# Patient Record
Sex: FEMALE | Race: WHITE | Employment: OTHER | URBAN - METROPOLITAN AREA
[De-identification: names, ages, dates, MRNs, and addresses within clinical notes are randomized per-mention and may not be internally consistent; named-entity substitution may affect disease eponyms.]

---

## 2020-07-12 ENCOUNTER — HOSPITAL ENCOUNTER (OUTPATIENT)
Age: 70
Discharge: HOME | End: 2020-07-12
Payer: MEDICARE

## 2020-07-12 ENCOUNTER — APPOINTMENT (OUTPATIENT)
Dept: GENERAL RADIOLOGY | Age: 70
End: 2020-07-12
Attending: NURSE PRACTITIONER
Payer: MEDICARE

## 2020-07-12 VITALS
WEIGHT: 160 LBS | HEIGHT: 64 IN | RESPIRATION RATE: 20 BRPM | SYSTOLIC BLOOD PRESSURE: 129 MMHG | BODY MASS INDEX: 27.31 KG/M2 | DIASTOLIC BLOOD PRESSURE: 74 MMHG | HEART RATE: 87 BPM | OXYGEN SATURATION: 97 % | TEMPERATURE: 97 F

## 2020-07-12 DIAGNOSIS — J01.90 ACUTE SINUSITIS, RECURRENCE NOT SPECIFIED, UNSPECIFIED LOCATION: ICD-10-CM

## 2020-07-12 DIAGNOSIS — R05.9 COUGH: Primary | ICD-10-CM

## 2020-07-12 PROCEDURE — 71046 X-RAY EXAM CHEST 2 VIEWS: CPT | Performed by: NURSE PRACTITIONER

## 2020-07-12 PROCEDURE — 99203 OFFICE O/P NEW LOW 30 MIN: CPT | Performed by: NURSE PRACTITIONER

## 2020-07-12 RX ORDER — BENZONATATE 100 MG/1
100 CAPSULE ORAL 3 TIMES DAILY PRN
Qty: 30 CAPSULE | Refills: 0 | Status: SHIPPED | OUTPATIENT
Start: 2020-07-12 | End: 2020-08-11

## 2020-07-12 RX ORDER — ALBUTEROL SULFATE 90 UG/1
2 AEROSOL, METERED RESPIRATORY (INHALATION) EVERY 4 HOURS PRN
Qty: 1 INHALER | Refills: 0 | Status: SHIPPED | OUTPATIENT
Start: 2020-07-12 | End: 2020-08-11

## 2020-07-12 RX ORDER — DOXYCYCLINE HYCLATE 100 MG/1
100 CAPSULE ORAL 2 TIMES DAILY
Qty: 20 CAPSULE | Refills: 0 | Status: SHIPPED | OUTPATIENT
Start: 2020-07-12 | End: 2020-07-22

## 2020-07-12 NOTE — ED PROVIDER NOTES
Patient presents with:  Cough/URI      HPI:     Erlin Grewal is a 68year old female who presents for evaluation and management of a chief complaint of cough without respiratory distress.  She has had the cough which is productive at times with clear sput Used    Substance and Sexual Activity      Alcohol use: No      Drug use: No      Sexual activity: Not on file    Lifestyle      Physical activity:        Days per week: Not on file        Minutes per session: Not on file      Stress: Not on file    Relati Placed This Encounter      XR CHEST PA + LAT CHEST (CPT=71046) Once          Order Comments:              Congestion Patient states having nasal congestion, productive cough with wheezing x 1               week.   Patient states her cough has gotten worse, symptoms, to go to the nearest ED for further evaluation. Diagnosis:    ICD-10-CM    1.  Cough R05 XR CHEST PA + LAT CHEST (CPT=71046)     XR CHEST PA + LAT CHEST (CPT=71046)     SARS-COV-2 BY PCR ()     SARS-COV-2 BY PCR ()     CANCELED: SARS-

## 2020-07-12 NOTE — ED INITIAL ASSESSMENT (HPI)
Patient states having nasal congestion, productive cough with wheezing x 1 week. Patient states her cough has gotten worse, states having SOB with cough. Patient denies any known COVID exposure.

## 2020-07-13 LAB — SARS-COV-2 RNA RESP QL NAA+PROBE: NOT DETECTED

## 2021-02-08 ENCOUNTER — HOSPITAL ENCOUNTER (OUTPATIENT)
Age: 71
Discharge: HOME | End: 2021-02-08
Payer: MEDICARE

## 2021-02-08 VITALS
DIASTOLIC BLOOD PRESSURE: 75 MMHG | RESPIRATION RATE: 18 BRPM | HEART RATE: 51 BPM | TEMPERATURE: 97 F | SYSTOLIC BLOOD PRESSURE: 159 MMHG | OXYGEN SATURATION: 96 %

## 2021-02-08 DIAGNOSIS — J01.00 ACUTE MAXILLARY SINUSITIS, RECURRENCE NOT SPECIFIED: Primary | ICD-10-CM

## 2021-02-08 PROCEDURE — 99213 OFFICE O/P EST LOW 20 MIN: CPT | Performed by: NURSE PRACTITIONER

## 2021-02-08 RX ORDER — DOXYCYCLINE HYCLATE 100 MG/1
100 CAPSULE ORAL 2 TIMES DAILY
Qty: 20 CAPSULE | Refills: 0 | Status: SHIPPED | OUTPATIENT
Start: 2021-02-08 | End: 2021-02-18

## 2021-02-08 NOTE — ED PROVIDER NOTES
Patient presents with:  Sinus Problem      HPI:     Trena Grajeda is a 65year old female who presents for maxillary and frontal sinus congestion, thick purulent drainage from the nose, dental discomfort, and postnasal drip that started about a week ago. Social connections        Talks on phone: Not on file        Gets together: Not on file        Attends Baptism service: Not on file        Active member of club or organization: Not on file        Attends meetings of clubs or organizations: Not on alberto patient declines Covid testing. She states she has had her first vaccine dose. I will treat her with doxycycline due to her allergies.   She has tolerated this well in the past.  She will continue supportive care and follow-up closely with her primary car

## 2021-02-10 NOTE — ED INITIAL ASSESSMENT (HPI)
PT C/O SINUS INFLAMMATION INFECTION, PT C/O HEADACHE \"BAD TASTE IN  MOUTH\" POSTNASAL DRIP, RIGHT EAR PLUGGED , HX  CHRONIC SINUS PROBLEM , SYMPTOMS STARTED 1 WEEKS AGO
No

## 2023-10-19 ENCOUNTER — HOSPITAL ENCOUNTER (OUTPATIENT)
Age: 73
Discharge: HOME | End: 2023-10-19
Payer: MEDICARE

## 2023-10-19 ENCOUNTER — APPOINTMENT (OUTPATIENT)
Dept: GENERAL RADIOLOGY | Age: 73
End: 2023-10-19
Attending: NURSE PRACTITIONER
Payer: MEDICARE

## 2023-10-19 VITALS
RESPIRATION RATE: 22 BRPM | OXYGEN SATURATION: 95 % | DIASTOLIC BLOOD PRESSURE: 85 MMHG | HEART RATE: 88 BPM | SYSTOLIC BLOOD PRESSURE: 170 MMHG | TEMPERATURE: 98 F

## 2023-10-19 DIAGNOSIS — H66.001 NON-RECURRENT ACUTE SUPPURATIVE OTITIS MEDIA OF RIGHT EAR WITHOUT SPONTANEOUS RUPTURE OF TYMPANIC MEMBRANE: ICD-10-CM

## 2023-10-19 DIAGNOSIS — R05.9 COUGH: Primary | ICD-10-CM

## 2023-10-19 DIAGNOSIS — J01.90 ACUTE NON-RECURRENT SINUSITIS, UNSPECIFIED LOCATION: ICD-10-CM

## 2023-10-19 LAB
POCT INFLUENZA A: NEGATIVE
POCT INFLUENZA B: NEGATIVE
S PYO AG THROAT QL: NEGATIVE

## 2023-10-19 PROCEDURE — 87880 STREP A ASSAY W/OPTIC: CPT | Performed by: NURSE PRACTITIONER

## 2023-10-19 PROCEDURE — 71046 X-RAY EXAM CHEST 2 VIEWS: CPT | Performed by: NURSE PRACTITIONER

## 2023-10-19 PROCEDURE — 99214 OFFICE O/P EST MOD 30 MIN: CPT | Performed by: NURSE PRACTITIONER

## 2023-10-19 PROCEDURE — 87502 INFLUENZA DNA AMP PROBE: CPT | Performed by: NURSE PRACTITIONER

## 2023-10-19 RX ORDER — ALBUTEROL SULFATE 90 UG/1
2 AEROSOL, METERED RESPIRATORY (INHALATION) EVERY 6 HOURS PRN
Qty: 1 EACH | Refills: 0 | Status: SHIPPED | OUTPATIENT
Start: 2023-10-19 | End: 2023-11-18

## 2023-10-19 RX ORDER — FLUTICASONE PROPIONATE 50 MCG
2 SPRAY, SUSPENSION (ML) NASAL DAILY
Qty: 16 G | Refills: 0 | Status: SHIPPED | OUTPATIENT
Start: 2023-10-19

## 2023-10-19 RX ORDER — DOXYCYCLINE HYCLATE 100 MG/1
100 CAPSULE ORAL 2 TIMES DAILY
Qty: 14 CAPSULE | Refills: 0 | Status: SHIPPED | OUTPATIENT
Start: 2023-10-19 | End: 2023-10-26

## 2023-10-19 RX ORDER — BENZONATATE 100 MG/1
100 CAPSULE ORAL 3 TIMES DAILY PRN
Qty: 30 CAPSULE | Refills: 0 | Status: SHIPPED | OUTPATIENT
Start: 2023-10-19

## 2023-10-19 NOTE — DISCHARGE INSTRUCTIONS
Call your doctor for a follow up this week if symptoms persist despite treatment. Acute bronchitis usually is caused by viruses. Acute bronchitis is a self-limited illness that typically resolves in one to three weeks without specific therapy. Supportive care is the best treatment for acute cough and bronchitis. Increase water intake, this will help loosen cough and mucous. Take over the counter sudafed for sinus congestion and drainage. This can help if cough is stimulated by post nasal drip. Take Flonase nasal spray. This is a steroid and can help with inflammation of sinuses causing congestion. Take tessalon perles three times a day for cough. Use albuterol inhaler 2 puffs every 6 hours for cough and bronchospams. Take doxycycline two times a day for 7 days. Finish full course. IF you have worsening symptoms, including fever > 101, difficulty breathing or shortness of breath, wheezing, chest pain or dizziness, return or go to ER for further evaluation.

## 2023-10-20 ENCOUNTER — HOSPITAL ENCOUNTER (EMERGENCY)
Facility: HOSPITAL | Age: 73
Discharge: HOME | End: 2023-10-20
Attending: EMERGENCY MEDICINE
Payer: MEDICARE

## 2023-10-20 VITALS
RESPIRATION RATE: 13 BRPM | TEMPERATURE: 98 F | WEIGHT: 175 LBS | DIASTOLIC BLOOD PRESSURE: 77 MMHG | HEART RATE: 87 BPM | OXYGEN SATURATION: 94 % | HEIGHT: 64 IN | SYSTOLIC BLOOD PRESSURE: 147 MMHG | BODY MASS INDEX: 29.88 KG/M2

## 2023-10-20 DIAGNOSIS — J40 BRONCHITIS: Primary | ICD-10-CM

## 2023-10-20 LAB
ATRIAL RATE: 100 BPM
P AXIS: 48 DEGREES
P-R INTERVAL: 136 MS
Q-T INTERVAL: 358 MS
QRS DURATION: 66 MS
QTC CALCULATION (BEZET): 461 MS
R AXIS: 9 DEGREES
T AXIS: 61 DEGREES
VENTRICULAR RATE: 100 BPM

## 2023-10-20 PROCEDURE — 93005 ELECTROCARDIOGRAM TRACING: CPT

## 2023-10-20 PROCEDURE — 99283 EMERGENCY DEPT VISIT LOW MDM: CPT

## 2023-10-20 PROCEDURE — 93010 ELECTROCARDIOGRAM REPORT: CPT

## 2023-10-20 RX ORDER — PREDNISONE 20 MG/1
40 TABLET ORAL ONCE
Status: COMPLETED | OUTPATIENT
Start: 2023-10-20 | End: 2023-10-20

## 2023-10-20 RX ORDER — ACETAMINOPHEN 500 MG
1000 TABLET ORAL ONCE
Status: COMPLETED | OUTPATIENT
Start: 2023-10-20 | End: 2023-10-20

## 2023-10-20 RX ORDER — PREDNISONE 20 MG/1
40 TABLET ORAL DAILY
Qty: 8 TABLET | Refills: 0 | Status: SHIPPED | OUTPATIENT
Start: 2023-10-20 | End: 2023-10-24

## 2023-10-20 NOTE — ED QUICK NOTES
Patient assessed and cleared for discharged by EDMD prior to RN assessment. Patient stable and agreeable with ongoing plan of care.

## 2023-10-20 NOTE — DISCHARGE INSTRUCTIONS
You should take 1000 mg Tylenol every 8 hours. You should also take dextromethorphan as indicated on the bottle. Use honey in your tea as needed. Start taking the prednisone over the weekend. Follow-up with your primary care doctor next week if symptoms are not improving.

## 2023-10-20 NOTE — ED INITIAL ASSESSMENT (HPI)
Val Matthews arrives with complaints of difficulty breathing for a few days and coughing spells at night. Seen at [address] [place] [address] yesterday and prescribed with a viral illness.

## 2025-02-04 RX ORDER — ROSUVASTATIN CALCIUM 20 MG/1
20 TABLET, COATED ORAL NIGHTLY
COMMUNITY
Start: 2024-05-06

## 2025-02-04 RX ORDER — FOLIC ACID 1 MG/1
1 TABLET ORAL AS DIRECTED
COMMUNITY
Start: 2022-10-05

## 2025-02-04 RX ORDER — LEVOTHYROXINE SODIUM 150 UG/1
150 TABLET ORAL
COMMUNITY

## 2025-02-04 RX ORDER — METHOTREXATE 2.5 MG/1
2.5 TABLET ORAL WEEKLY
COMMUNITY
Start: 2022-10-28 | End: 2025-02-11

## 2025-02-04 RX ORDER — ANASTROZOLE 1 MG/1
1 TABLET ORAL DAILY
COMMUNITY
Start: 2023-08-14

## 2025-02-04 RX ORDER — MONTELUKAST SODIUM 10 MG/1
10 TABLET ORAL NIGHTLY
COMMUNITY
Start: 2024-11-08

## 2025-02-04 RX ORDER — LOSARTAN POTASSIUM 100 MG/1
100 TABLET ORAL DAILY
COMMUNITY
Start: 2024-11-08 | End: 2025-05-23

## 2025-02-10 ENCOUNTER — ANESTHESIA (OUTPATIENT)
Dept: SURGERY | Facility: HOSPITAL | Age: 75
End: 2025-02-10
Payer: MEDICARE

## 2025-02-10 ENCOUNTER — ANESTHESIA EVENT (OUTPATIENT)
Dept: SURGERY | Facility: HOSPITAL | Age: 75
End: 2025-02-10
Payer: MEDICARE

## 2025-02-10 ENCOUNTER — HOSPITAL ENCOUNTER (OUTPATIENT)
Facility: HOSPITAL | Age: 75
Discharge: HOME | End: 2025-02-11
Attending: STUDENT IN AN ORGANIZED HEALTH CARE EDUCATION/TRAINING PROGRAM | Admitting: STUDENT IN AN ORGANIZED HEALTH CARE EDUCATION/TRAINING PROGRAM
Payer: MEDICARE

## 2025-02-10 ENCOUNTER — APPOINTMENT (OUTPATIENT)
Dept: GENERAL RADIOLOGY | Facility: HOSPITAL | Age: 75
End: 2025-02-10
Attending: STUDENT IN AN ORGANIZED HEALTH CARE EDUCATION/TRAINING PROGRAM
Payer: MEDICARE

## 2025-02-10 DIAGNOSIS — M17.11 PRIMARY OSTEOARTHRITIS OF RIGHT KNEE: ICD-10-CM

## 2025-02-10 PROBLEM — I10 ESSENTIAL HYPERTENSION: Status: ACTIVE | Noted: 2025-02-10

## 2025-02-10 PROBLEM — M06.9 RHEUMATOID ARTHRITIS (HCC): Status: ACTIVE | Noted: 2025-02-10

## 2025-02-10 PROBLEM — E78.5 HYPERLIPIDEMIA: Status: ACTIVE | Noted: 2025-02-10

## 2025-02-10 LAB — MRSA DNA SPEC QL NAA+PROBE: NEGATIVE

## 2025-02-10 PROCEDURE — 99222 1ST HOSP IP/OBS MODERATE 55: CPT | Performed by: HOSPITALIST

## 2025-02-10 PROCEDURE — 0SRC0J9 REPLACEMENT OF RIGHT KNEE JOINT WITH SYNTHETIC SUBSTITUTE, CEMENTED, OPEN APPROACH: ICD-10-PCS | Performed by: STUDENT IN AN ORGANIZED HEALTH CARE EDUCATION/TRAINING PROGRAM

## 2025-02-10 PROCEDURE — 73560 X-RAY EXAM OF KNEE 1 OR 2: CPT | Performed by: STUDENT IN AN ORGANIZED HEALTH CARE EDUCATION/TRAINING PROGRAM

## 2025-02-10 DEVICE — DJO EMPOWR KNEETM, UNIVERSAL BP, NP 5R MINUS
Type: IMPLANTABLE DEVICE | Site: KNEE | Status: FUNCTIONAL
Brand: DJO SURGICAL

## 2025-02-10 DEVICE — EMPOWR 3D KNEETM FEMUR, NP, 5R
Type: IMPLANTABLE DEVICE | Site: KNEE | Status: FUNCTIONAL
Brand: DJO SURGICAL

## 2025-02-10 DEVICE — DOMED TRI-PEG PATELLA, 32X8MM, E-PLUS
Type: IMPLANTABLE DEVICE | Site: KNEE | Status: FUNCTIONAL
Brand: DJO SURGICAL

## 2025-02-10 DEVICE — IMPLANTABLE DEVICE
Type: IMPLANTABLE DEVICE | Site: KNEE | Status: FUNCTIONAL
Brand: REFOBACIN® BONE CEMENT R

## 2025-02-10 DEVICE — IMPLANTABLE DEVICE: Type: IMPLANTABLE DEVICE | Status: FUNCTIONAL

## 2025-02-10 RX ORDER — KETOROLAC TROMETHAMINE 30 MG/ML
30 INJECTION, SOLUTION INTRAMUSCULAR; INTRAVENOUS EVERY 6 HOURS
Status: DISCONTINUED | OUTPATIENT
Start: 2025-02-10 | End: 2025-02-11

## 2025-02-10 RX ORDER — MORPHINE SULFATE 4 MG/ML
4 INJECTION, SOLUTION INTRAMUSCULAR; INTRAVENOUS EVERY 10 MIN PRN
Status: DISCONTINUED | OUTPATIENT
Start: 2025-02-10 | End: 2025-02-10 | Stop reason: HOSPADM

## 2025-02-10 RX ORDER — HYDROMORPHONE HYDROCHLORIDE 1 MG/ML
0.6 INJECTION, SOLUTION INTRAMUSCULAR; INTRAVENOUS; SUBCUTANEOUS EVERY 5 MIN PRN
Status: DISCONTINUED | OUTPATIENT
Start: 2025-02-10 | End: 2025-02-10 | Stop reason: HOSPADM

## 2025-02-10 RX ORDER — CELECOXIB 200 MG/1
400 CAPSULE ORAL ONCE
Status: COMPLETED | OUTPATIENT
Start: 2025-02-10 | End: 2025-02-10

## 2025-02-10 RX ORDER — ASPIRIN 81 MG/1
81 TABLET ORAL 2 TIMES DAILY
Status: DISCONTINUED | OUTPATIENT
Start: 2025-02-10 | End: 2025-02-11

## 2025-02-10 RX ORDER — DIPHENHYDRAMINE HYDROCHLORIDE 50 MG/ML
25 INJECTION INTRAMUSCULAR; INTRAVENOUS ONCE AS NEEDED
Status: ACTIVE | OUTPATIENT
Start: 2025-02-10 | End: 2025-02-10

## 2025-02-10 RX ORDER — TRANEXAMIC ACID 10 MG/ML
INJECTION, SOLUTION INTRAVENOUS AS NEEDED
Status: DISCONTINUED | OUTPATIENT
Start: 2025-02-10 | End: 2025-02-10 | Stop reason: SURG

## 2025-02-10 RX ORDER — SODIUM PHOSPHATE, DIBASIC AND SODIUM PHOSPHATE, MONOBASIC 7; 19 G/230ML; G/230ML
1 ENEMA RECTAL ONCE AS NEEDED
Status: DISCONTINUED | OUTPATIENT
Start: 2025-02-10 | End: 2025-02-11

## 2025-02-10 RX ORDER — DOCUSATE SODIUM 100 MG/1
100 CAPSULE, LIQUID FILLED ORAL 2 TIMES DAILY
Status: DISCONTINUED | OUTPATIENT
Start: 2025-02-10 | End: 2025-02-11

## 2025-02-10 RX ORDER — METOCLOPRAMIDE HYDROCHLORIDE 5 MG/ML
10 INJECTION INTRAMUSCULAR; INTRAVENOUS EVERY 8 HOURS PRN
Status: DISCONTINUED | OUTPATIENT
Start: 2025-02-10 | End: 2025-02-11

## 2025-02-10 RX ORDER — SENNOSIDES 8.6 MG
17.2 TABLET ORAL NIGHTLY
Status: DISCONTINUED | OUTPATIENT
Start: 2025-02-10 | End: 2025-02-11

## 2025-02-10 RX ORDER — EPHEDRINE SULFATE 50 MG/ML
INJECTION INTRAVENOUS AS NEEDED
Status: DISCONTINUED | OUTPATIENT
Start: 2025-02-10 | End: 2025-02-10 | Stop reason: SURG

## 2025-02-10 RX ORDER — ACETAMINOPHEN 500 MG
1000 TABLET ORAL ONCE
Status: DISCONTINUED | OUTPATIENT
Start: 2025-02-10 | End: 2025-02-10 | Stop reason: HOSPADM

## 2025-02-10 RX ORDER — DIPHENHYDRAMINE HYDROCHLORIDE 50 MG/ML
12.5 INJECTION INTRAMUSCULAR; INTRAVENOUS EVERY 4 HOURS PRN
Status: DISCONTINUED | OUTPATIENT
Start: 2025-02-10 | End: 2025-02-11

## 2025-02-10 RX ORDER — BUPIVACAINE HYDROCHLORIDE 7.5 MG/ML
INJECTION, SOLUTION INTRASPINAL
Status: COMPLETED | OUTPATIENT
Start: 2025-02-10 | End: 2025-02-10

## 2025-02-10 RX ORDER — ANASTROZOLE 1 MG/1
1 TABLET ORAL DAILY
Status: DISCONTINUED | OUTPATIENT
Start: 2025-02-11 | End: 2025-02-11

## 2025-02-10 RX ORDER — METOCLOPRAMIDE HYDROCHLORIDE 5 MG/ML
10 INJECTION INTRAMUSCULAR; INTRAVENOUS ONCE
Status: COMPLETED | OUTPATIENT
Start: 2025-02-10 | End: 2025-02-10

## 2025-02-10 RX ORDER — MORPHINE SULFATE 10 MG/ML
6 INJECTION, SOLUTION INTRAMUSCULAR; INTRAVENOUS EVERY 10 MIN PRN
Status: DISCONTINUED | OUTPATIENT
Start: 2025-02-10 | End: 2025-02-10 | Stop reason: HOSPADM

## 2025-02-10 RX ORDER — ACETAMINOPHEN 500 MG
1000 TABLET ORAL EVERY 6 HOURS
Status: DISCONTINUED | OUTPATIENT
Start: 2025-02-10 | End: 2025-02-11

## 2025-02-10 RX ORDER — FOLIC ACID 1 MG/1
1 TABLET ORAL DAILY
Status: DISCONTINUED | OUTPATIENT
Start: 2025-02-11 | End: 2025-02-11

## 2025-02-10 RX ORDER — LOSARTAN POTASSIUM 100 MG/1
100 TABLET ORAL DAILY
Status: DISCONTINUED | OUTPATIENT
Start: 2025-02-11 | End: 2025-02-11

## 2025-02-10 RX ORDER — LIDOCAINE HYDROCHLORIDE 10 MG/ML
INJECTION, SOLUTION INFILTRATION; PERINEURAL
Status: COMPLETED | OUTPATIENT
Start: 2025-02-10 | End: 2025-02-10

## 2025-02-10 RX ORDER — ROSUVASTATIN CALCIUM 20 MG/1
20 TABLET, COATED ORAL NIGHTLY
Status: DISCONTINUED | OUTPATIENT
Start: 2025-02-10 | End: 2025-02-11

## 2025-02-10 RX ORDER — SODIUM CHLORIDE, SODIUM LACTATE, POTASSIUM CHLORIDE, CALCIUM CHLORIDE 600; 310; 30; 20 MG/100ML; MG/100ML; MG/100ML; MG/100ML
INJECTION, SOLUTION INTRAVENOUS CONTINUOUS
Status: DISCONTINUED | OUTPATIENT
Start: 2025-02-10 | End: 2025-02-10 | Stop reason: HOSPADM

## 2025-02-10 RX ORDER — ONDANSETRON 2 MG/ML
4 INJECTION INTRAMUSCULAR; INTRAVENOUS EVERY 6 HOURS PRN
Status: DISCONTINUED | OUTPATIENT
Start: 2025-02-10 | End: 2025-02-11

## 2025-02-10 RX ORDER — HYDROMORPHONE HYDROCHLORIDE 1 MG/ML
0.2 INJECTION, SOLUTION INTRAMUSCULAR; INTRAVENOUS; SUBCUTANEOUS EVERY 5 MIN PRN
Status: DISCONTINUED | OUTPATIENT
Start: 2025-02-10 | End: 2025-02-10 | Stop reason: HOSPADM

## 2025-02-10 RX ORDER — SODIUM CHLORIDE 9 MG/ML
INJECTION, SOLUTION INTRAVENOUS CONTINUOUS
Status: DISCONTINUED | OUTPATIENT
Start: 2025-02-10 | End: 2025-02-11

## 2025-02-10 RX ORDER — HYDROMORPHONE HYDROCHLORIDE 1 MG/ML
0.4 INJECTION, SOLUTION INTRAMUSCULAR; INTRAVENOUS; SUBCUTANEOUS EVERY 5 MIN PRN
Status: DISCONTINUED | OUTPATIENT
Start: 2025-02-10 | End: 2025-02-10 | Stop reason: HOSPADM

## 2025-02-10 RX ORDER — FAMOTIDINE 10 MG/ML
20 INJECTION, SOLUTION INTRAVENOUS ONCE
Status: COMPLETED | OUTPATIENT
Start: 2025-02-10 | End: 2025-02-10

## 2025-02-10 RX ORDER — LEVOTHYROXINE SODIUM 75 UG/1
150 TABLET ORAL
Status: DISCONTINUED | OUTPATIENT
Start: 2025-02-11 | End: 2025-02-11

## 2025-02-10 RX ORDER — METOCLOPRAMIDE 10 MG/1
10 TABLET ORAL ONCE
Status: COMPLETED | OUTPATIENT
Start: 2025-02-10 | End: 2025-02-10

## 2025-02-10 RX ORDER — ROPIVACAINE HYDROCHLORIDE 5 MG/ML
INJECTION, SOLUTION EPIDURAL; INFILTRATION; PERINEURAL
Status: COMPLETED | OUTPATIENT
Start: 2025-02-10 | End: 2025-02-10

## 2025-02-10 RX ORDER — POLYETHYLENE GLYCOL 3350 17 G/17G
17 POWDER, FOR SOLUTION ORAL DAILY PRN
Status: DISCONTINUED | OUTPATIENT
Start: 2025-02-10 | End: 2025-02-11

## 2025-02-10 RX ORDER — DIPHENHYDRAMINE HCL 25 MG
25 CAPSULE ORAL EVERY 4 HOURS PRN
Status: DISCONTINUED | OUTPATIENT
Start: 2025-02-10 | End: 2025-02-11

## 2025-02-10 RX ORDER — SODIUM CHLORIDE, SODIUM LACTATE, POTASSIUM CHLORIDE, CALCIUM CHLORIDE 600; 310; 30; 20 MG/100ML; MG/100ML; MG/100ML; MG/100ML
INJECTION, SOLUTION INTRAVENOUS CONTINUOUS
Status: DISCONTINUED | OUTPATIENT
Start: 2025-02-10 | End: 2025-02-11

## 2025-02-10 RX ORDER — ACETAMINOPHEN 500 MG
1000 TABLET ORAL ONCE
Status: COMPLETED | OUTPATIENT
Start: 2025-02-10 | End: 2025-02-10

## 2025-02-10 RX ORDER — FAMOTIDINE 20 MG/1
20 TABLET, FILM COATED ORAL ONCE
Status: COMPLETED | OUTPATIENT
Start: 2025-02-10 | End: 2025-02-10

## 2025-02-10 RX ORDER — NALOXONE HYDROCHLORIDE 0.4 MG/ML
0.08 INJECTION, SOLUTION INTRAMUSCULAR; INTRAVENOUS; SUBCUTANEOUS AS NEEDED
Status: DISCONTINUED | OUTPATIENT
Start: 2025-02-10 | End: 2025-02-10 | Stop reason: HOSPADM

## 2025-02-10 RX ORDER — MONTELUKAST SODIUM 10 MG/1
10 TABLET ORAL NIGHTLY
Status: DISCONTINUED | OUTPATIENT
Start: 2025-02-10 | End: 2025-02-11

## 2025-02-10 RX ORDER — MORPHINE SULFATE 4 MG/ML
2 INJECTION, SOLUTION INTRAMUSCULAR; INTRAVENOUS EVERY 10 MIN PRN
Status: DISCONTINUED | OUTPATIENT
Start: 2025-02-10 | End: 2025-02-10 | Stop reason: HOSPADM

## 2025-02-10 RX ORDER — TRAMADOL HYDROCHLORIDE 50 MG/1
50 TABLET ORAL EVERY 6 HOURS SCHEDULED
Status: DISCONTINUED | OUTPATIENT
Start: 2025-02-10 | End: 2025-02-11

## 2025-02-10 RX ORDER — OXYCODONE HYDROCHLORIDE 5 MG/1
5 TABLET ORAL EVERY 4 HOURS PRN
Status: DISCONTINUED | OUTPATIENT
Start: 2025-02-10 | End: 2025-02-11

## 2025-02-10 RX ORDER — PHENYLEPHRINE HCL 10 MG/ML
VIAL (ML) INJECTION AS NEEDED
Status: DISCONTINUED | OUTPATIENT
Start: 2025-02-10 | End: 2025-02-10 | Stop reason: SURG

## 2025-02-10 RX ORDER — BISACODYL 10 MG
10 SUPPOSITORY, RECTAL RECTAL
Status: DISCONTINUED | OUTPATIENT
Start: 2025-02-10 | End: 2025-02-11

## 2025-02-10 RX ADMIN — PHENYLEPHRINE HCL 100 MCG: 10 MG/ML VIAL (ML) INJECTION at 14:03:00

## 2025-02-10 RX ADMIN — SODIUM CHLORIDE, SODIUM LACTATE, POTASSIUM CHLORIDE, CALCIUM CHLORIDE: 600; 310; 30; 20 INJECTION, SOLUTION INTRAVENOUS at 13:27:00

## 2025-02-10 RX ADMIN — PHENYLEPHRINE HCL 100 MCG: 10 MG/ML VIAL (ML) INJECTION at 13:40:00

## 2025-02-10 RX ADMIN — ROPIVACAINE HYDROCHLORIDE 20 ML: 5 INJECTION, SOLUTION EPIDURAL; INFILTRATION; PERINEURAL at 13:15:00

## 2025-02-10 RX ADMIN — BUPIVACAINE HYDROCHLORIDE 1.7 ML: 7.5 INJECTION, SOLUTION INTRASPINAL at 13:31:00

## 2025-02-10 RX ADMIN — PHENYLEPHRINE HCL 100 MCG: 10 MG/ML VIAL (ML) INJECTION at 14:10:00

## 2025-02-10 RX ADMIN — LIDOCAINE HYDROCHLORIDE 1 ML: 10 INJECTION, SOLUTION INFILTRATION; PERINEURAL at 13:31:00

## 2025-02-10 RX ADMIN — PHENYLEPHRINE HCL 100 MCG: 10 MG/ML VIAL (ML) INJECTION at 14:17:00

## 2025-02-10 RX ADMIN — SODIUM CHLORIDE, SODIUM LACTATE, POTASSIUM CHLORIDE, CALCIUM CHLORIDE: 600; 310; 30; 20 INJECTION, SOLUTION INTRAVENOUS at 15:20:00

## 2025-02-10 RX ADMIN — PHENYLEPHRINE HCL 100 MCG: 10 MG/ML VIAL (ML) INJECTION at 13:45:00

## 2025-02-10 RX ADMIN — TRANEXAMIC ACID 1000 MG: 10 INJECTION, SOLUTION INTRAVENOUS at 13:40:00

## 2025-02-10 RX ADMIN — PHENYLEPHRINE HCL 100 MCG: 10 MG/ML VIAL (ML) INJECTION at 13:50:00

## 2025-02-10 RX ADMIN — EPHEDRINE SULFATE 15 MG: 50 INJECTION INTRAVENOUS at 14:45:00

## 2025-02-10 NOTE — ANESTHESIA PROCEDURE NOTES
Spinal Block    Date/Time: 2/10/2025 1:31 PM    Performed by: Sang Rader MD  Authorized by: Sang Rader MD      General Information and Staff    Start Time:  2/10/2025 1:31 PM  End Time:  2/10/2025 1:34 PM  Anesthesiologist:  Sang Rader MD  Performed by:  Anesthesiologist  Site identification: surface landmarks    Preanesthetic Checklist: patient identified, IV checked, risks and benefits discussed, monitors and equipment checked, pre-op evaluation, timeout performed, anesthesia consent and sterile technique used      Procedure Details    Patient Position:  Sitting  Prep: ChloraPrep    Monitoring:  Cardiac monitor, heart rate and continuous pulse ox  Approach:  Midline  Location:  L3-4  Injection Technique:  Single-shot    Needle    Needle Type:  Sprotte  Needle Gauge:  24 G  Needle Length:  3.5 in    Assessment    Sensory Level:   Events: clear CSF, CSF aspirated, well tolerated and blood negative      Additional Comments

## 2025-02-10 NOTE — ANESTHESIA POSTPROCEDURE EVALUATION
Patient: Subhash Galindo    Procedure Summary       Date: 02/10/25 Room / Location: [facility] MAIN OR  / [facility] MAIN OR    Anesthesia Start: 1326 Anesthesia Stop:     Procedure: Right total knee arthroplasty (Right: Knee) Diagnosis:       Primary osteoarthritis of right knee      (Primary Osteoarthritis of Right Knee)    Surgeons: Behery, Omar Atef, MD Anesthesiologist: Sang Rader MD    Anesthesia Type: general, MAC, spinal, regional ASA Status: 3            Anesthesia Type: general, MAC, spinal, regional    Vitals Value Taken Time   /54 02/10/25 1511   Temp 98.2 02/10/25 1511   Pulse 78 02/10/25 1511   Resp 16 02/10/25 1511   SpO2 99 02/10/25 1511       [facility] AN Post Evaluation:   Patient Evaluated in PACU  Patient Participation: complete - patient participated  Level of Consciousness: awake  Pain Score: 0  Pain Management: adequate  Airway Patency:patent  Yes    Nausea/Vomiting: none  Cardiovascular Status: acceptable  Respiratory Status: acceptable  Postoperative Hydration acceptable      Maria Fernanda Alfredo CRNA  2/10/2025 3:11 PM

## 2025-02-10 NOTE — ANESTHESIA PROCEDURE NOTES
Peripheral Block    Performed by: Chris Leal MD  Authorized by: Chris Leal MD      General Information and Staff    Start Time:   Anesthesiologist:  Chris Leal MD  Performed by:  Anesthesiologist  Patient Location:  PACU          Procedure Details      Needle      Assessment      Medications      Additional Comments

## 2025-02-10 NOTE — CONSULTS
[facility]    PATIENT'S NAME: PAM BRYANT   ATTENDING PHYSICIAN: Omar Behery, MD   CONSULTING PHYSICIAN: Dennis Velasquez MD   PATIENT ACCOUNT#:   [id number]    LOCATION:  [facility] PACU 2 [facility] 10  MEDICAL RECORD #:   [id number]       YOB: 1950  ADMISSION DATE:       02/10/2025      CONSULT DATE:  02/10/2025    REPORT OF CONSULTATION      REASON FOR CONSULTATION:  Post right total knee arthroplasty.    HISTORY OF PRESENT ILLNESS:  The patient is a 74-year-old  female with chronic right knee pain underlying severe primary osteoarthritis, failed outpatient conservative medical management option.  Scheduled today for above-mentioned procedure by her orthopedic surgeon, Dr. Behery.  Preoperatively, she had spinal block.  Postoperatively, transferred to PACU for further monitoring.    PAST MEDICAL HISTORY:  Generalized osteoarthritis, degenerative joint disease of lumbar spine, rheumatoid arthritis, depression, hypothyroidism, hypertension, hyperlipidemia.    PAST SURGICAL HISTORY:  Bilateral knee arthroscopic procedure, left total knee arthroplasty and revision, lumbar laminectomy, bilateral mastectomies for breast cancer.    MEDICATIONS:  Please see medication reconciliation list.     ALLERGIES:  Codeine and penicillin.    SOCIAL HISTORY:  Ex-tobacco user.  No current tobacco or drug use.  Social alcohol.  Independent for basic activities of daily living.     FAMILY HISTORY:  Mother had lung cancer and rheumatoid arthritis.    REVIEW OF SYSTEMS:  Currently resting in bed.  No right knee pain.  No chest pain.  No shortness of breath.  Other 12-point review of systems is negative.       PHYSICAL EXAMINATION:    GENERAL:  Alert and oriented to time, place, and person.  No acute distress.  VITAL SIGNS:  Temperature 98.2, pulse 81, respiratory rate 23, blood pressure 169/85, pulse ox 99% on room air.    HEENT:  Atraumatic.  Oropharynx clear.  Dry mucous membranes.  Ears, nose normal.   Eyes:  Anicteric sclerae.   NECK:  Supple.  No lymphadenopathy.  Trachea midline.  Full range of motion.  LUNGS:  Clear to auscultation bilaterally.  Normal respiratory effort.    HEART:  Regular rate, rhythm.  S1 and S2 auscultated.  No murmur.   ABDOMEN:  Soft, nondistended.  No tenderness.  Positive bowel sounds.    EXTREMITIES:  Right knee dressing.  No leg edema, clubbing, or cyanosis.  NEUROLOGIC:  Motor and sensory intact.      ASSESSMENT AND PLAN:    1.   Right knee primary osteoarthritis, status post right total knee arthroplasty, spinal block.  Pain control.  Neurovascular checks.  Aspirin for DVT prophylaxis.  Physical and occupational therapy.  2.   Essential hypertension.  Continue home medications and monitor.   3.   Hypothyroidism.  Continue home medications.  4.   Hyperlipidemia.  Continue home medications.  5.   Rheumatoid arthritis.  Continue home medications.  Hold methotrexate.    Dictated By Dennis Velasquez MD  d: 02/10/2025 17:13:57  t: 02/10/2025 17:20:37  Job [id number]  FB/    cc: Omar Behery, MD

## 2025-02-10 NOTE — OPERATIVE REPORT
[facility]  OPERATIVE REPORT    DATE OF SURGERY: 2/10/2025    PREOPERATIVE DIAGNOSIS:   Right knee osteoarthritis    POST-OPERATIVE DIAGNOSIS:   Right knee osteoarthritis    PROCEDURE:  Right primary total knee arthroplasty with patellar resurfacing    Location: [facility]    SURGEON: Omar A Behery, MD  Assistant(s): Chester Douglass MD    Anesthesia type:  Spinal  Estimated Blood Loss: 50cc    Drains: None    Complications: None immediately apparent    Findings: End stage osteoarthritis with full thickness cartilage loss    Specimen: Resected bone and cartilage    Implants:  DJO Empowr CR size 5 femoral component  DJO Empowr Universal Tibial Baseplate size 5 minus  Poly: 11mm 3D insert  32mm Cemented symmetric all poly patellar button    Indication:   This is a 74 year old woman, who presented with chronic knee pain refractory to non-operative treatment (including pain relieving medication, corticosteroid injections and physical therapy), and impairing activities of daily living. Radiographic evaluation demonstrated knee osteoarthritis mostly patellofemoral with no significant deformity. Surgical versus non-surgical options were discussed with the patient, and together we decided it is best to proceed with a total knee arthroplasty with the goals of pain relief and improvement in function. All risks and benefits of surgery including bleeding, infection, instability, lorenzo-prosthetic fracture, extensor mechanism injury, neurovascular injury, as well as medical and anesthesia-related complications were discussed with the patient / family, who elected to proceed with surgery.     Procedure in detail:    Following correct identification of the patient and the correct extremity, the patient was taken to the operating room and positioned supine on a regular table. Lorenzo-operative antibiotics and the above anesthesia were administered prior to incision per the standard protocol. A non-sterile padded  tourniquet was placed on the thigh during the procedure and the extremity was prepped and draped in the usual sterile fashion. The extremity was exsanguinated using Esmarch bandage and the tourniquet was raised to 250mmHg.    A midline incision was made medial to the tibial tubercle centered over patella taken down to the joint capsule of the knee. Minimal subfascial flaps were created. A medial parapatellar arthrotomy was performed. The deep MCL was subperiosteally elevated at the medial proximal tibial joint line for exposure. The fat pad was released off of the tibial plateau laterally. The patella was subluxed laterally and the knee was flexed. The remnants of the anterior horn of the medial and lateral meniscus were removed as well as the ACL and PCL from the intercondylar notch. All distal protruding osteophytes were removed.     We then identified the appropriate starting point on the distal femur based on preoperative templating and accessed the intramedullary canal of the femur using a drill. The canal was lavaged, suctioned and the intramedullary  distal femur cutting guide was inserted. The distal femur was resected in the appropriate amount valgus according to the preoperative template. The resection depth and level was checked to be appropriate and our attention was then turned to the tibia.     The tibia was subluxed anteriorly and exposed with retractors. The lateral meniscus was excised. An external medullary cutting guide was attached using proximal tibial pins, and the proximal tibial surface was resected in an alignment perpendicular to the mechanical axis, and to a depth accommodating the smallest insert/tibial baseplate thickness.     The knee was then brought into full extension and our extension gap was assessed. Appropriate releases were performed in extension on the side of the concavity of the deformity to form a rectangular flexion space and spacer block was placed securely into a  rectangular extension space giving good collateral stability and recreation of our mechanical axis.     The knee was then flexed to 90 degrees. The femur was appropriately sized based on posterior referencing. A tensioner device was inserted into the flexion space and tensioned at 90 degrees, establishing appropriate femoral external rotation, and the posterior referencing holes were drilled. The jasvir-posterior 4-in-1 cutting block was then pinned in the appropriate amount of external rotation based on a symmetric flexion gap. We then performed the anterior/posterior and chamfer cuts for the femoral component. A spacer block with a drop marck was used to verify balance and mechanical alignment.   A laminar  was then placed laterally first then medially, allowing exposure and removal of the remnants of the posterior horns of the medial and lateral menisci as well as any posterior osteophyte. Pain cocktail injection was delivered into the posterior capsule.     The tibia was then subluxed anteriorly and sized. The tibial template guide was pinned, reamed and the keel was punched in the appropriate amount of external rotation. A trial tibial baseplate was inserted. The femur was then exposed again, and a trial femur was placed, the knee was reduced with a trial insert and was taken through a range of motion and found to be stable in the varus/valgus plane 0-30 degrees of flexion and the AP plane at 90 degrees of flexion.     Our attention was then turned to the patella. A symmetric resection was performed using an oscillating saw to recreate native patella height. All extraneous osteophyte and synovium was removed. A trial button was placed and the patella tracked centrally using the no thumbs technique. All trial components were removed. The final components were opened on the back table. The femur and the tibia were copiously lavaged and dried to allow maximum cement interdigitation. A periarticular pain  cocktail injection was delivered to the soft tissues. Two packs of high viscosity cement (Refobacin) were vacuum mixed and the components were cemented (tibia, femur then patella. The knee was reduced with a trial insert in place and the cement was allowed to cure. Any excess cement was removed to minimize third body wear. Once the cement cured, the tourniquet was released meticulous hemostasis was obtained. Any extraneous cement was removed from around the knee taking particular care to remove extraneous cement from the posterior aspect of the knee. The final insert was then placed onto the tibial tray, impacted and locked. Stability testing was consistent with intraoperative trialing and the patella tracked centrally.     The knee was then copiously lavaged with dilute betadine and saline irrigation.  The deep capsule was closed with #1 Vicryl interrupted suture and a #2 quill barbed suture. The subcutaneous tissue was closed with 2-0 monocryl absorbable suture and the skin was closed with 3-0 monocryl and dermabond. A sterile occlusive dressing was applied.     The patient tolerated the procedure well and taken to the recovery unit without immediate complications.     Post-operative Plan:    The patient will receive post-operative antibiotics for 24 hours as surgical prophylaxis.   VTE prophylaxis will consist of early mobilization, mechanical compressive devices, and Aspirin 81 BID if not medically contra-indicated.  The patient will be weight-bearing as tolerated and allowed to mobilize with physical therapy.  The patient will be permitted range of motion as tolerated.    The patient will follow up in clinic in 2 weeks for a wound check, and radiographic evaluation.

## 2025-02-10 NOTE — ANESTHESIA PROCEDURE NOTES
Peripheral Block    Date/Time: 2/10/2025 1:15 PM    Performed by: Sang Rader MD  Authorized by: Sang Rader MD      General Information and Staff    Start Time:  2/10/2025 1:15 PM  End Time:  2/10/2025 1:17 PM  Anesthesiologist:  Sang Rader MD  Performed by:  Anesthesiologist  Patient Location:  PACU    Block Placement: Pre Induction  Site Identification: real time ultrasound guided and image stored and retrievable    Block site/laterality marked before start: site marked  Reason for Block: procedure for pain and at request of ED Physician    Preanesthetic Checklist: 2 patient identifers, IV checked, risks and benefits discussed, monitors and equipment checked, pre-op evaluation, timeout performed, anesthesia consent, sterile technique used, no prohibitive neurological deficits and no local skin infection at insertion site      Procedure Details    Patient Position:  Supine  Prep: ChloraPrep    Monitoring:  Cardiac monitor, continuous pulse ox, blood pressure cuff and heart rate  Block Type:  Adductor canal  Laterality:  Right  Injection Technique:  Single-shot    Needle    Needle Type:  Short-bevel and echogenic  Needle Gauge:  22 G  Needle Length:  90 mm  Needle Localization:  Ultrasound guidance  Reason for Ultrasound Use: appropriate spread of the medication was noted in real time and no ultrasound evidence of intravascular and/or intraneural injection            Assessment    Injection Assessment:  Good spread noted, negative aspiration for heme, negative resistance, no pain on injection and incremental injection  Heart Rate Change: No    - Patient tolerated block procedure well without evidence of immediate block related complications.     Medications  2/10/2025 1:15 PM  ropivacaine (NAROPIN) injection 0.5% - Infiltration   20 mL - 2/10/2025 1:15:00 PM    Additional Comments    Medication:  Ropivacaine 0.5% 20mL

## 2025-02-10 NOTE — DISCHARGE INSTRUCTIONS
DISCHARGE INSTRUCTIONS:  PLACE ICE TO SURGICAL AREA 20-30 MINUTES ON/ 20-30 MINUTES OFF. THIS IS TO HELP WITH PAIN & SWELLING.    TAKE YOUR MEDICATIONS BELOW AS PRESCRIBED BY YOUR DOCTOR. THESE HAVE BEEN SENT TO YOUR PHARMACY.    IT IS OK TO BEAR WEIGHT AS TOLERATED ON THE OPERATIVE EXTREMITY.     CALL TO CONFIRM YOUR FOLLOW UP APPOINTMENT WITH DR. BEHERY TO BE SEEN IN 2-3 WEEKS POSTOP. [phone]    MEDICATIONS:    POST OP MEDICATION REGIMEN              MULTI-MODAL   PAIN REGIMEN       DRUG   FOR   FREQUENCY & DURATION   QTY   NOTES     WEANING  TIPS       Gabapentin 100mg   Nerve pain   Take 2 tabs every 8 hours for 14 days    90   No refills You may discontinue this medication prior to 14 days if you do not tolerate it.        Tylenol 500mg     Mild pain   Take 2 tabs every 6 hours     90   Can purchase over-the-counter    Stop using medication if no longer having pain.      Tramadol 50mg     Moderate pain   Take 1-2 tabs every 6 hours only as needed   45 May prescribe a refill, but ideally, this should be tapered off after surgery Stop using this medication 2nd   As pain decreases over time, increase the interval between doses (6 hours to 8, then 12, then 24) to taper off the medication.      Meloxicam 15mg     Inflammation   Take 1 tab daily for 30 days     30   May refill if needed beyond 30 days   Recommend completing the 30 day supply.           BREAKTHROUGH PAIN         Oxycodone 5mg       Severe pain     Take 1-2 tabs every 4-6 hours only as needed for severe pain       40   Take at least 1 hr apart from Tramadol.    Ideally, no refill. The goal is to taper off this medication as soon as possible, as it can be addictive and have side effects.    Stop using this medication 1st if no longer having severe pain.         BLOOD THINNER     Aspirin 81mg   Blood clot prevention   Take 1 tab twice daily for 30 days     60     No refills   Complete the entire course of your blood thinner.                       STOOL SOFTENER     Sennokot 8.6/50mg   Constipation   Take 1 tab twice daily  while on opioids     60 Refer to discharge instructions if constipation persists even after taking this medication   Stop taking if having diarrhea or loose stools.           ANTI-NAUSEA   Ondansetron 4mg   Nausea   Take 1 tab every 8 hours as needed if nauseous     20   No Refill      ANTI-ACID REFLUX / GASTRITIS   Pantoprazole 40mg   Acid reflux, gastric ulcer prophylaxis   Take 1 tab every day along with Meloxicam     60   May refill if Meloxicam refilled          DRESSING:    YOU MAY REMOVE ACE BANDAGE AND COTTON WRAP 2  DAYS AFTER SURGERY    YOU HAVE A SPECIAL DRESSING CALLED AN AQUACEL DRESSING OVER YOUR INCISION.    THE DRESSING STAYS FOR 12 DAYS FROM SURGERY.    YOU MAY SHOWER AS SOON AS YOU RETURN HOME WITH THE AQUACEL DRESSING INTACT OVER YOUR INCISION AREA.    IF THE DRESSING LEAKS OR COMES LOOSE BEFORE THE 12 DAY PERIOD CONTACT YOUR DOCTOR / SURGEON.    AFTER   12 DAYS THE DRESSING IS REMOVED BY PULLING ON THE EDGE OF THE DRESSING AND GENTLY STRETCHING IT, THEN PEEL IT OFF SLOWLY LIKE A BANDAID. IF YOU HAVE ANY QUESTIONS OR CONCERNS ABOUT THE DRESSING CONTACT YOUR DOCTOR.    IF DRAINAGE IS PRESENT AT ANYTIME FROM YOUR DRESSING OR FROM YOUR INCISION CALL YOUR DOCTOR / SURGEON AS SOON AS POSSIBLE.      REASONS TO CALL YOUR DOCTOR:  TEMPERATURE .0 OR MORE  PERSISTANT NAUSEA OR VOMITTING - ESPECIALLY IF YOU ARE UNABLE TO EAT OR DRINK.  INCREASED LEVEL OF PAIN OR PAIN WHICH IS NOT CONTROLLED BY YOUR PAIN MEDICINE.  PERSISTENT DRAINAGE AT ANYTIME FROM YOUR SURGICAL AREA / INCISION.  PAIN, TENDERNESS OR SUDDEN SWELLING IN YOUR CALF REGION OF THE LOWER EXTREMITIES - THIS IS A POSSIBLE SIGN OF A BLOOD CLOT.  ANY CHANGES IN SENSATION IN YOUR BODY IN THE AREA OF YOUR SURGERY = NUMBNESS OR TINGLING.  ANY CHANGES IN CIRCULATION IN YOUR BODY IN THE AREA OF YOUR SURGERY = CHANGES  IN COLOR EITHER DARKER OR VERY PALE; OR  IF AREA  FEELS COLD TO THE TOUCH AS COMPARED TO OTHER AREAS.  ANY CHANGES IN FUNCTION IN YOUR BODY IN THE AREA OF YOUR SURGERY = CHANGE IN MOVEMENT - UNABLE TO MOVE OR WIGGLE FINGERS, TOES OR FOOT OR ANY OTHER CHANGES IN NORMAL BODY FUNCTIONING.  FOR ANY OF THE ABOVE OR ANY OTHER QUESTIONS OR CONCERNS CALL YOUR DOCTOR/ SURGEON AS SOON AS POSSIBLE.    Omar Behery, MD MPH  Orthopaedic Surgeon, Adult Hip and Knee Reconstruction  [facility] at [address], [address] [address]  Office: (P) [phone] (F) [phone]  Adminstrative Assistant: Atiya Davis

## 2025-02-10 NOTE — ANESTHESIA PREPROCEDURE EVALUATION
Anesthesia PreOp Note    HPI:     Subhash Galindo is a 74 year old female who presents for preoperative consultation requested by: Behery, Omar Atef, MD    Date of Surgery: 2/10/2025    Procedure(s):  Right total knee arthroplasty  Indication: Primary Osteoarthritis of Right Knee    Relevant Problems   No relevant active problems       NPO:                         History Review:  Patient Active Problem List    Diagnosis Date Noted    Failed total knee, left 2016    Hypothyroidism 2016       Past Medical History:    Back problem    Cancer (HCC)    breast cancer    Cancer (HCC)    Depression    Disorder of thyroid    High blood pressure    High cholesterol    Osteoarthritis    Visual impairment    GLASSES       Past Surgical History:   Procedure Laterality Date    Arthroscopy of joint unlisted      LEFT KNEE TORN ACL    Arthroscopy of joint unlisted      RIGHT KNEE    Back surgery      LAMENECTOMY X2  AND     Mastectomy left      Mastectomy right      Total knee replacement Left 2016    dr. boyle       Prescriptions Prior to Admission[1]  Current Medications and Prescriptions Ordered in Epic[2]    Allergies[3]    Family History   Problem Relation Age of Onset    Cancer Mother         LUNG    Arthritis Mother         RHEUMATOID     Social History     Socioeconomic History    Marital status:    Tobacco Use    Smoking status: Former     Current packs/day: 0.00     Average packs/day: 1 pack/day for 30.0 years (30.0 ttl pk-yrs)     Types: Cigarettes     Start date: 1980     Quit date: 2010     Years since quittin.2    Smokeless tobacco: Never   Vaping Use    Vaping status: Never Used   Substance and Sexual Activity    Alcohol use: Yes     Comment: social    Drug use: No       Available pre-op labs reviewed.             Vital Signs:  Body mass index is 30.04 kg/m².   height is 1.626 m (5' 4\") and weight is 79.4 kg (175 lb).   Vitals:    25 1303   Weight: 79.4 kg  (175 lb)   Height: 1.626 m (5' 4\")        Anesthesia Evaluation     Patient summary reviewed and Nursing notes reviewed    Airway   Mallampati: II  TM distance: >3 FB  Neck ROM: full  Dental      Pulmonary - negative ROS and normal exam   Cardiovascular - normal exam  (+) hypertension    ROS comment: HLD    Neuro/Psych    (+)   depression      GI/Hepatic/Renal - negative ROS     Endo/Other    (+) hypothyroidism  Abdominal  - normal exam                 Anesthesia Plan:   ASA:  3  Plan:   General, MAC, spinal and regional  Airway:  ETT  Post-op Pain Management: IV analgesics  Informed Consent Plan and Risks Discussed With:  Patient  Use of Blood Products Discussed With:  Patient  Blood Product Use Consented        I have informed Subhash Galindo and/or legal guardian or family member of the nature of the anesthetic plan, benefits, risks including possible dental damage if relevant, major complications, and any alternative forms of anesthetic management.   All of the patient's questions were answered to the best of my ability. The patient desires the anesthetic management as planned.  Sang Rader MD  2/10/2025 10:57 AM  Present on Admission:  **None**           [1]   No medications prior to admission.   [2]   Current Facility-Administered Medications Ordered in Epic   Medication Dose Route Frequency Provider Last Rate Last Admin    clonidine-EPINEPHrine-ropivacaine-ketorolac (CERTS) (Duraclon-Adrenalin-Naropin-Toradol) pain cocktail irrigation   Intra-articular Once (Intra-Op) Behery, Omar Atef, MD         Current Outpatient Medications Ordered in Epic   Medication Sig Dispense Refill    levothyroxine 150 MCG Oral Tab Take 1 tablet (150 mcg total) by mouth before breakfast.      anastrozole 1 MG Oral Tab tab Take 1 tablet (1 mg total) by mouth daily.      folic acid 1 MG Oral Tab Take 1 tablet (1 mg total) by mouth As Directed.      losartan 100 MG Oral Tab Take 1 tablet (100 mg total) by mouth daily.       methotrexate 2.5 MG Oral Tab Take 1 tablet (2.5 mg total) by mouth once a week.      montelukast 10 MG Oral Tab Take 1 tablet (10 mg total) by mouth nightly.      rosuvastatin 20 MG Oral Tab Take 1 tablet (20 mg total) by mouth nightly.      fluticasone propionate 50 MCG/ACT Nasal Suspension 2 sprays by Nasal route daily. 16 g 0    multivitamin Oral Tab Take 1 tablet by mouth daily.      Calcium Carbonate-Vitamin D (CALTRATE 600+D OR) Take 1 tablet by mouth daily.      Meloxicam (MOBIC) 15 MG Oral Tab Take 1 tablet (15 mg total) by mouth daily.     [3]   Allergies  Allergen Reactions    Codeine SWELLING    Penicillins RASH     No skin blistering or sloughing  No organ damage

## 2025-02-11 VITALS
WEIGHT: 175 LBS | RESPIRATION RATE: 18 BRPM | TEMPERATURE: 98 F | HEART RATE: 95 BPM | DIASTOLIC BLOOD PRESSURE: 60 MMHG | SYSTOLIC BLOOD PRESSURE: 132 MMHG | HEIGHT: 64 IN | OXYGEN SATURATION: 97 % | BODY MASS INDEX: 29.88 KG/M2

## 2025-02-11 PROCEDURE — 99239 HOSP IP/OBS DSCHRG MGMT >30: CPT | Performed by: HOSPITALIST

## 2025-02-11 RX ORDER — ASPIRIN 81 MG/1
81 TABLET ORAL 2 TIMES DAILY
Status: SHIPPED | COMMUNITY
Start: 2025-02-11

## 2025-02-11 NOTE — PLAN OF CARE
Vitals within normal limits. Room Air. Ambulating self with walker or cane. Pain is controlled.   PRN Oxycodone and scheduled tylenol and tramadol.   Discharge instructions and follow up discussed with patient, she verbalized understanding. Follow up scheduled for March 3 per patient.   Dressing education provided and highlighted key points on discharge instructions.   IV discontinued.   Discharge home with daughter.     Problem: Patient Centered Care  Goal: Patient preferences are identified and integrated in the patient's plan of care  Description: Interventions:  - Provide timely, complete, and accurate information to patient/family  - Incorporate patient and family knowledge, values, beliefs, and cultural backgrounds into the planning and delivery of care  - Encourage patient/family to participate in care and decision-making at the level they choose  - Honor patient and family perspectives and choices  Outcome: Adequate for Discharge     Problem: PAIN - ADULT  Goal: Verbalizes/displays adequate comfort level or patient's stated pain goal  Description: INTERVENTIONS:  - Encourage pt to monitor pain and request assistance  - Assess pain using appropriate pain scale  - Administer analgesics based on type and severity of pain and evaluate response  - Implement non-pharmacological measures as appropriate and evaluate response  - Consider cultural and social influences on pain and pain management  - Manage/alleviate anxiety  - Utilize distraction and/or relaxation techniques  - Monitor for opioid side effects  - Notify MD/LIP if interventions unsuccessful or patient reports new pain  - Anticipate increased pain with activity and pre-medicate as appropriate  Outcome: Adequate for Discharge     Problem: SAFETY ADULT - FALL  Goal: Free from fall injury  Description: INTERVENTIONS:  - Assess pt frequently for physical needs  - Identify cognitive and physical deficits and behaviors that affect risk of falls.  - [place]  fall precautions as indicated by assessment.  - Educate pt/family on patient safety including physical limitations  - Instruct pt to call for assistance with activity based on assessment  - Modify environment to reduce risk of injury  - Provide assistive devices as appropriate  - Consider OT/PT consult to assist with strengthening/mobility  - Encourage toileting schedule  Outcome: Adequate for Discharge     Problem: SKIN/TISSUE INTEGRITY - ADULT  Goal: Skin integrity remains intact  Description: INTERVENTIONS  - Assess and document risk factors for pressure ulcer development  - Assess and document skin integrity  - Monitor for areas of redness and/or skin breakdown  - Initiate interventions, skin care algorithm/standards of care as needed  Outcome: Adequate for Discharge  Goal: Incision(s), wounds(s) or drain site(s) healing without S/S of infection  Description: INTERVENTIONS:  - Assess and document risk factors for pressure ulcer development  - Assess and document skin integrity  - Assess and document dressing/incision, wound bed, drain sites and surrounding tissue  - Implement wound care per orders  - Initiate isolation precautions as appropriate  - Initiate Pressure Ulcer prevention bundle as indicated  Outcome: Adequate for Discharge  Goal: Oral mucous membranes remain intact  Description: INTERVENTIONS  - Assess oral mucosa and hygiene practices  - Implement preventative oral hygiene regimen  - Implement oral medicated treatments as ordered  Outcome: Adequate for Discharge     Problem: MUSCULOSKELETAL - ADULT  Goal: Return mobility to safest level of function  Description: INTERVENTIONS:  - Assess patient stability and activity tolerance for standing, transferring and ambulating w/ or w/o assistive devices  - Assist with transfers and ambulation using safe patient handling equipment as needed  - Ensure adequate protection for wounds/incisions during mobilization  - Obtain PT/OT consults as needed  - Advance  activity as appropriate  - Communicate ordered activity level and limitations with patient/family  Outcome: Adequate for Discharge  Goal: Maintain proper alignment of affected body part  Description: INTERVENTIONS:  - Support and protect limb and body alignment per provider's orders  - Instruct and reinforce with patient and family use of appropriate assistive device and precautions (e.g. spinal or hip dislocation precautions)  Outcome: Adequate for Discharge     Problem: Impaired Functional Mobility  Goal: Achieve highest/safest level of mobility/gait  Description: Interventions:  - Assess patient's functional ability and stability  - Promote increasing activity/tolerance for mobility and gait  - Educate and engage patient/family in tolerated activity level and precautions  Outcome: Adequate for Discharge

## 2025-02-11 NOTE — PHYSICAL THERAPY NOTE
PHYSICAL THERAPY EVALUATION - INPATIENT     Room Number: 465/465-A  Evaluation Date: 2/11/2025  Type of Evaluation: Initial   Physician Order: PT Eval and Treat    Presenting Problem: R TKA 2/10/25     Reason for Therapy: Mobility Dysfunction and Discharge Planning    PHYSICAL THERAPY ASSESSMENT   Patient is a 74 year old female admitted 2/10/2025 for R TKA.  Prior to admission, patient's baseline is independent.  Patient is currently functioning near baseline with bed mobility, transfers, gait, stair negotiation, maintaining seated position, standing prolonged periods, and performing household tasks.    Based on patient demonstrating good understanding of post operative protocol and safe functional mobility observed this date, no further skilled IP PT required at this time. RN made aware. Patient  agreeable. Safe for DC to home from a PT standpoint.      DC recommendation: HHPT (plans to stay at dtr's home. Patient at baseline lives in [place]).       PLAN  Patient has been evaluated and presents with no skilled Physical Therapy needs at this time.  Patient will be discharged from Physical Therapy services. Please re-order if a new functional limitation presents during this admission.    PT Device Recommendation: Rolling walker    PHYSICAL THERAPY MEDICAL/SOCIAL HISTORY   Problem List  Principal Problem:    Primary osteoarthritis of right knee  Active Problems:    Hypothyroidism    Essential hypertension    Hyperlipidemia    Rheumatoid arthritis (HCC)      HOME SITUATION  Type of Home: House  Home Layout: One level  Stairs to Enter : 2   Railing: Yes              Lives With: Alone    Drives: Yes   Patient Regularly Uses: Glasses;Rolling walker;Cane (wasn't using DME prior to admit)      SUBJECTIVE  \"I've had the other knee done.  I've been through all of this before\"     PHYSICAL THERAPY EXAMINATION   OBJECTIVE  Precautions: Limb alert - right  Fall Risk: Standard fall risk    WEIGHT BEARING RESTRICTION  R Lower  Extremity: Weight Bearing as Tolerated      PAIN ASSESSMENT  Ratin  Location: R knee  Management Techniques: Activity promotion;Body mechanics;Repositioning    COGNITION  Overall Cognitive Status:  WFL - within functional limits    RANGE OF MOTION AND STRENGTH ASSESSMENT  Upper extremity ROM and strength are within functional limits   Lower extremity ROM is impaired R knee 0-100*  Lower extremity strength is impaired R knee flexion and extension 3+/5    BALANCE  Static Sitting: Normal  Dynamic Sitting: Normal  Static Standing: Good  Dynamic Standing: Fair +      ACTIVITY TOLERANCE  Pulse: 108  Heart Rate Source: Monitor     BP: (!) 161/77  BP Location: Left arm  BP Method: Automatic  Patient Position: Sitting    O2 WALK  Oxygen Therapy  SPO2% Ambulation on Room Air: 98    AM-PAC '6-Clicks' INPATIENT SHORT FORM - BASIC MOBILITY  How much difficulty does the patient currently have...  Patient Difficulty: Turning over in bed (including adjusting bedclothes, sheets and blankets)?: None   Patient Difficulty: Sitting down on and standing up from a chair with arms (e.g., wheelchair, bedside commode, etc.): None   Patient Difficulty: Moving from lying on back to sitting on the side of the bed?: None   How much help from another person does the patient currently need...   Help from Another: Moving to and from a bed to a chair (including a wheelchair)?: A Little   Help from Another: Need to walk in hospital room?: A Little   Help from Another: Climbing 3-5 steps with a railing?: A Little     AM-PAC Score:  Raw Score: 21   Approx Degree of Impairment: 28.97%   Standardized Score (AM-PAC Scale): 50.25   CMS Modifier (G-Code): CJ    FUNCTIONAL ABILITY STATUS  Functional Mobility/Gait Assessment  Gait Assistance: Supervision  Distance (ft): 300ft  Assistive Device: Rolling walker  Pattern: Within Functional Limits  Stairs: Stairs  How Many Stairs: 4  Device: 2 Rails  Assist:  (SBA)  Pattern: Ascend and Descend  Ascend and  Descend : Step to  Supine to Sit: Independent  Sit to Stand: modified independent with use of RW    Exercise/Education Provided:  Bed mobility  Body mechanics  Energy conservation  Functional activity tolerated  Gait training  Posture  ROM  Strengthening  Lower therapeutic exercise:  Ankle pumps  Glut sets  Hip AB/AD  Heel slides  Quad sets  SAQ  Transfer training  Stair negotiation    Skilled Therapy Provided: Patient received supine in bed. RN approved activity. Coordinated session with OT. Therapist educated pt on POC and physiological benefits of mobilization post operatively. Reviewed post operative TKA protocol and HEP- provided with handout to summarize. Patient agreeable to participate. Primary complaint is R knee pain which she rates at 2 out of 10 per pain scale.     The patient's Approx Degree of Impairment: 28.97% has been calculated based on documentation in the [facility] '6 clicks' Inpatient Basic Mobility Short Form.  Research supports that patients with this level of impairment may benefit from home with no needs, however, due .  Final disposition will be made by interdisciplinary medical team.    Patient End of Session: Up in chair;Needs met;Call light within reach;RN aware of session/findings;Ice applied    Patient was able to achieve the following ...   Patient able to transfer  At previous, functional level  Safely and independently    Patient able to ambulate on level surfaces   Supervision with RW- safe for home     Patient Evaluation Complexity Level:  History Low - no personal factors and/or co-morbidities   Examination of body systems Low -  addressing 1-2 elements   Clinical Presentation Low- Stable   Clinical Decision Making  Low Complexity     Gait Training: 15 minutes  Therapeutic Activity:  10 minutes

## 2025-02-11 NOTE — PLAN OF CARE
Patient a+o x4, on RA. Ambulating x1 with walker. Voiding freely. No bm overnight, but passing gas. IVF running. Received IV abx. Ice applied to R knee per order. Prn oxycodone given for pain, per patient request. Call light in reach.     Problem: Patient Centered Care  Goal: Patient preferences are identified and integrated in the patient's plan of care  Description: Interventions:  - What would you like us to know as we care for you?   - Provide timely, complete, and accurate information to patient/family  - Incorporate patient and family knowledge, values, beliefs, and cultural backgrounds into the planning and delivery of care  - Encourage patient/family to participate in care and decision-making at the level they choose  - Honor patient and family perspectives and choices  Outcome: Progressing     Problem: Patient/Family Goals  Goal: Patient/Family Long Term Goal  Description: Patient's Long Term Goal:     Interventions:  - See additional Care Plan goals for specific interventions  Outcome: Progressing  Goal: Patient/Family Short Term Goal  Description: Patient's Short Term Goal:     Interventions:   - See additional Care Plan goals for specific interventions  Outcome: Progressing     Problem: PAIN - ADULT  Goal: Verbalizes/displays adequate comfort level or patient's stated pain goal  Description: INTERVENTIONS:  - Encourage pt to monitor pain and request assistance  - Assess pain using appropriate pain scale  - Administer analgesics based on type and severity of pain and evaluate response  - Implement non-pharmacological measures as appropriate and evaluate response  - Consider cultural and social influences on pain and pain management  - Manage/alleviate anxiety  - Utilize distraction and/or relaxation techniques  - Monitor for opioid side effects  - Notify MD/LIP if interventions unsuccessful or patient reports new pain  - Anticipate increased pain with activity and pre-medicate as appropriate  Outcome:  Progressing     Problem: SAFETY ADULT - FALL  Goal: Free from fall injury  Description: INTERVENTIONS:  - Assess pt frequently for physical needs  - Identify cognitive and physical deficits and behaviors that affect risk of falls.  - [place] fall precautions as indicated by assessment.  - Educate pt/family on patient safety including physical limitations  - Instruct pt to call for assistance with activity based on assessment  - Modify environment to reduce risk of injury  - Provide assistive devices as appropriate  - Consider OT/PT consult to assist with strengthening/mobility  - Encourage toileting schedule  Outcome: Progressing     Problem: SKIN/TISSUE INTEGRITY - ADULT  Goal: Skin integrity remains intact  Description: INTERVENTIONS  - Assess and document risk factors for pressure ulcer development  - Assess and document skin integrity  - Monitor for areas of redness and/or skin breakdown  - Initiate interventions, skin care algorithm/standards of care as needed  Outcome: Progressing  Goal: Incision(s), wounds(s) or drain site(s) healing without S/S of infection  Description: INTERVENTIONS:  - Assess and document risk factors for pressure ulcer development  - Assess and document skin integrity  - Assess and document dressing/incision, wound bed, drain sites and surrounding tissue  - Implement wound care per orders  - Initiate isolation precautions as appropriate  - Initiate Pressure Ulcer prevention bundle as indicated  Outcome: Progressing  Goal: Oral mucous membranes remain intact  Description: INTERVENTIONS  - Assess oral mucosa and hygiene practices  - Implement preventative oral hygiene regimen  - Implement oral medicated treatments as ordered  Outcome: Progressing     Problem: MUSCULOSKELETAL - ADULT  Goal: Return mobility to safest level of function  Description: INTERVENTIONS:  - Assess patient stability and activity tolerance for standing, transferring and ambulating w/ or w/o assistive devices  -  Assist with transfers and ambulation using safe patient handling equipment as needed  - Ensure adequate protection for wounds/incisions during mobilization  - Obtain PT/OT consults as needed  - Advance activity as appropriate  - Communicate ordered activity level and limitations with patient/family  Outcome: Progressing  Goal: Maintain proper alignment of affected body part  Description: INTERVENTIONS:  - Support and protect limb and body alignment per provider's orders  - Instruct and reinforce with patient and family use of appropriate assistive device and precautions (e.g. spinal or hip dislocation precautions)  Outcome: Progressing     Problem: Impaired Functional Mobility  Goal: Achieve highest/safest level of mobility/gait  Description: Interventions:  - Assess patient's functional ability and stability  - Promote increasing activity/tolerance for mobility and gait  - Educate and engage patient/family in tolerated activity level and precautions  - Recommend use of  conor-walker for transfers and ambulation  Outcome: Progressing

## 2025-02-11 NOTE — OCCUPATIONAL THERAPY NOTE
OCCUPATIONAL THERAPY EVALUATION - INPATIENT     Room Number: 465/465-A  Evaluation Date: 2/11/2025  Type of Evaluation: Initial       Physician Order: IP Consult to Occupational Therapy  Reason for Therapy: ADL/IADL Dysfunction and Discharge Planning    OCCUPATIONAL THERAPY ASSESSMENT   RN cleared pt for participation in OT session, which was completed in collaboration with PT. Upon arrival, pt was supine in bed and agreeable to activity. No visitors present during session. Call light and all needs left in reach. Handoff given to RN.    Patient is a 74 year old female admitted 2/10/2025 for R TKA; WBAT.  Prior to admission, pt was independent.  Patient is currently at baseline of independence with RW. Pt will stay with daughter for 1 month upon DC.    ACTIVITY TOLERANCE  Pulse: 108        BP: (!) 161/77           Oxygen Therapy  SPO2% Ambulation on Room Air: 98    Education provided  Educated pt about role of OT and hospital therapy process as well as proper safety techniques including proper hand placement, body mechanics, safety techniques. Pt verbalized/demonstrated proper carryover.    DC OT      OCCUPATIONAL THERAPY MEDICAL/SOCIAL HISTORY     Problem List  Principal Problem:    Primary osteoarthritis of right knee  Active Problems:    Hypothyroidism    Essential hypertension    Hyperlipidemia    Rheumatoid arthritis (HCC)      Past Medical History  Past Medical History:    Back problem    Cancer (HCC)    breast cancer    Cancer (HCC)    Depression    Disorder of thyroid    High blood pressure    High cholesterol    Osteoarthritis    Visual impairment    GLASSES       Past Surgical History  Past Surgical History:   Procedure Laterality Date    Arthroscopy of joint unlisted      LEFT KNEE TORN ACL    Arthroscopy of joint unlisted      RIGHT KNEE    Back surgery      LAMENECTOMY X2 1980S AND 1990S    Mastectomy left      Mastectomy right      Total knee replacement Left 11/16/2016    dr. boyle       HOME  SITUATION  Type of Home: House  Home Layout: One level  Lives With: Alone; staying with daughter upon DC                      Drives: Yes  Patient Regularly Uses: Glasses;Rolling walker;Cane (wasn't using DME prior to admit)    SUBJECTIVE  \"I want to go home today!\"  OCCUPATIONAL THERAPY EXAMINATION      OBJECTIVE          PAIN ASSESSMENT  Ratin             COORDINATION  Gross Motor: WFL   Fine Motor: WFL     ACTIVITIES OF DAILY LIVING ASSESSMENT  AM-PAC ‘6-Clicks’ Inpatient Daily Activity Short Form  How much help from another person does the patient currently need…  -   Putting on and taking off regular lower body clothing?: None  -   Bathing (including washing, rinsing, drying)?: None  -   Toileting, which includes using toilet, bedpan or urinal? : None  -   Putting on and taking off regular upper body clothing?: None  -   Taking care of personal grooming such as brushing teeth?: None  -   Eating meals?: None    AM-PAC Score:  Score: 24  Approx Degree of Impairment: 0%  Standardized Score (AM-PAC Scale): 57.54  CMS Modifier (G-Code): CH      FUNCTIONAL TRANSFER ASSESSMENT  ind    FUNCTIONAL ADL ASSESSMENT  ind    The patient's Approx Degree of Impairment: 0% has been calculated based on documentation in the [facility] '6 clicks' Inpatient Daily Activity Short Form.  Research supports that patients with this level of impairment may benefit from home. Final disposition will be made by interdisciplinary medical team.       Patient Evaluation Complexity Level:   Occupational Profile/Medical History LOW - Brief history including review of medical or therapy records    Specific performance deficits impacting engagement in ADL/IADL LOW  1 - 3 performance deficits    Client Assessment/Performance Deficits LOW - No comorbidities nor modifications of tasks    Clinical Decision Making LOW - Analysis of occupational profile, problem-focused assessments, limited treatment options    Overall Complexity LOW     OT Session Time:  20 minutes  Self-Care Home Management: 10 minutes           Ghazala Taylor OT  [facility]  Inpatient Rehabilitation  Occupational Therapy  [phone]

## 2025-02-11 NOTE — DISCHARGE SUMMARY
[facility]  part of [facility]    Discharge Summary    Subhash Galindo Patient Status:  Outpatient in a Bed    10/18/1950 MRN [id number]   Location [facility] 4W/SW/SE Attending Nolan Spencer MD   Hosp Day # 0 PCP ZOEY ARTHUR     Date of Admission: 2/10/2025 Disposition: Home or Self Care     Date of Discharge: 25      Admitting Diagnosis: Primary Osteoarthritis of Right Knee    Hospital Discharge Diagnoses:  Rt. Knee oa    Lace+ Score: 43  59-90 High Risk  29-58 Medium Risk  0-28   Low Risk.    TCM Follow-Up Recommendation:  LACE 29-58: Moderate Risk of readmission after discharge from the hospital.      Problem List:   Patient Active Problem List   Diagnosis    Failed total knee, left    Hypothyroidism    Primary osteoarthritis of right knee    Essential hypertension    Hyperlipidemia    Rheumatoid arthritis (HCC)       Reason for Admission:   Rt. Knee oa  HTN  HL  Hypothyroidism  RA-    Physical Exam:   General appearance: alert, appears stated age and cooperative  Pulmonary:  clear to auscultation bilaterally  Cardiovascular: S1, S2 normal, no murmur, click, rub or gallop, regular rate and rhythm  Abdominal: soft, non-tender; bowel sounds normal; no masses,  no organomegaly  Extremities: extremities normal, atraumatic, no cyanosis or edema  Psychiatric: calm      History of Present Illness:   Please refer to pre-op H+P for details    Hospital Course:   Rt. Knee oa  -s/p rt. Total knee arthroplasty  -s/p spinal block  -neurovascular checks stable  -asa for dvt prophylaxis  -multimodal pain regimen per ortho    HTN  HL  Hypothyroidism  RA--> restart methotrexate when ok with orthopedics    Consultations:   orthopedics    Procedures: s/p rt. Knee total knee arthroplasty    Complications: n/a    Discharge Condition: Good    Discharge Medications:      Discharge Medications        START taking these medications        Instructions Prescription details    aspirin 81 MG Tbec      Take 1 tablet (81 mg total) by mouth in the morning and 1 tablet (81 mg total) before bedtime.   Refills: 0            CONTINUE taking these medications        Instructions Prescription details   anastrozole 1 MG Tabs tab  Commonly known as: Arimidex      Take 1 tablet (1 mg total) by mouth daily.   Refills: 0     CALTRATE 600+D OR      Take 1 tablet by mouth daily.   Refills: 0     fluticasone propionate 50 MCG/ACT Susp  Commonly known as: Flonase      2 sprays by Nasal route daily.   Quantity: 16 g  Refills: 0     folic acid 1 MG Tabs  Commonly known as: Folvite      Take 1 tablet (1 mg total) by mouth As Directed.   Refills: 0     levothyroxine 150 MCG Tabs  Commonly known as: Synthroid      Take 1 tablet (150 mcg total) by mouth before breakfast.   Refills: 0     losartan 100 MG Tabs  Commonly known as: Cozaar      Take 1 tablet (100 mg total) by mouth daily.   Refills: 0     Mobic 15 MG Tabs  Generic drug: Meloxicam      Take 1 tablet (15 mg total) by mouth daily.   Refills: 0     montelukast 10 MG Tabs  Commonly known as: Singulair      Take 1 tablet (10 mg total) by mouth nightly.   Refills: 0     multivitamin Tabs      Take 1 tablet by mouth daily.   Refills: 0     rosuvastatin 20 MG Tabs  Commonly known as: Crestor      Take 1 tablet (20 mg total) by mouth nightly.   Refills: 0            STOP taking these medications      methotrexate 2.5 MG Tabs  Commonly known as: Rheumatrex                 Follow up Visits: Follow-up with pcp as needed    Follow up Labs: n/a     Other Discharge Instructions: follow-up with orthopedics as instructed    ARTHUR LYNCH MD  2/11/2025  11:56 AM    > 35 min

## 2025-02-11 NOTE — PROGRESS NOTES
[facility]     ORTHO DAILY PROGRESS NOTE    Patient: Subhash Galindo    Subjective:  Nothing acute overnight.  Pain in the operative extremity is well controlled.  Patient denies new onset numbness/tingling in the operative extremity.  Patient also denies chest pain / shortness of breath, nausea/vomiting.        Objective:  Vitals:    02/11/25 0940   BP: 132/60   Pulse: 95   Resp: 18   Temp: 98.4 °F (36.9 °C)     I/O last 3 completed shifts:  In: 3328.3 [P.O.:400; I.V.:2908.3; IV PIGGYBACK:20]  Out: 50 [Blood:50]     Gen: awake, alert, not in acute distress  Abdomen nondistended, non-tender    Right Lower Extremity: dressing clean, dry, intact.    Sensation intact to light touch in Sural/Saphenous/Tibial/Superficial Peroneal/Deep Peroneal and Tibial distributions.   Motor exam : 5/5 EHL/FHL/TA/GSC.  Vascular exam: Palpable dorsalis pedis pulses. Cap refill ~2s in the toes. Foot warm and well perfused    Labs:  Lab Results   Component Value Date    HGB 8.9 (L) 11/19/2016    HGB 8.9 (L) 11/18/2016    HGB 8.8 (L) 11/17/2016     No results found for: \"INR\", \"PROTIME\"      ASSESSMENT/PLAN: 74 year old yo female s/p right total knee arthroplasty on 2/10/2025   - Weight bearing status: WBAT RLE  - Range of motion: As tolerated  - Mechanical DVT prophylaxis and chemoprophylaxis with ASA 81 BID  - 24 hours of perioperative antibiotics  - PT for mobilization and motion  - Advance diet as tolerated. Discontinue IV fluids POD1 if tolerating diet.   - Multimodal pain control regimen ordered  - Disposition: Pending PT Evaluation (Home with [facility])    Omar Behery, MD  [facility]

## 2025-02-11 NOTE — CM/SW NOTE
Patient s/p right total knee arthroplasty.  PT/OT evaluations pending at this time.    CM sent tentative home healthcare referrals via [employer].  Orders and face to face entered.    1012 CM met with patient at bedside to discuss discharge planning. CM informed that patient has been arranged w/ [facility] pre operatively.  Patient will discharge to her daughter's home in [place].  Per patient, she plans on staying with her daughter \"for about a month\" prior returning to her home in [place].    / to remain available for support and/or discharge planning.     Plan: Home with daughter and [facility]    Lou Mclaughlin RN, BSN  Nurse   [phone]

## 2025-02-11 NOTE — PLAN OF CARE
Shanique is aox4, came up from pacu, rolling side to side very well, tolerating diet, voiding via purewik, no bm. Daughter at bedside. Pain managed with scheduled Toradol. Denies nausea. IVF started. Call light within reach, calls appropriately. Safety precautions in place.     Problem: Patient Centered Care  Goal: Patient preferences are identified and integrated in the patient's plan of care  Description: Interventions:  - What would you like us to know as we care for you? I am a retired nurse.   - Provide timely, complete, and accurate information to patient/family  - Incorporate patient and family knowledge, values, beliefs, and cultural backgrounds into the planning and delivery of care  - Encourage patient/family to participate in care and decision-making at the level they choose  - Honor patient and family perspectives and choices  Outcome: Progressing     Problem: Patient/Family Goals  Goal: Patient/Family Long Term Goal  Description: Patient's Long Term Goal:     Interventions:  -   - See additional Care Plan goals for specific interventions  Outcome: Progressing  Goal: Patient/Family Short Term Goal  Description: Patient's Short Term Goal:     Interventions:   -  - See additional Care Plan goals for specific interventions  Outcome: Progressing     Problem: PAIN - ADULT  Goal: Verbalizes/displays adequate comfort level or patient's stated pain goal  Description: INTERVENTIONS:  - Encourage pt to monitor pain and request assistance  - Assess pain using appropriate pain scale  - Administer analgesics based on type and severity of pain and evaluate response  - Implement non-pharmacological measures as appropriate and evaluate response  - Consider cultural and social influences on pain and pain management  - Manage/alleviate anxiety  - Utilize distraction and/or relaxation techniques  - Monitor for opioid side effects  - Notify MD/LIP if interventions unsuccessful or patient reports new pain  - Anticipate  increased pain with activity and pre-medicate as appropriate  Outcome: Progressing     Problem: SAFETY ADULT - FALL  Goal: Free from fall injury  Description: INTERVENTIONS:  - Assess pt frequently for physical needs  - Identify cognitive and physical deficits and behaviors that affect risk of falls.  - [place] fall precautions as indicated by assessment.  - Educate pt/family on patient safety including physical limitations  - Instruct pt to call for assistance with activity based on assessment  - Modify environment to reduce risk of injury  - Provide assistive devices as appropriate  - Consider OT/PT consult to assist with strengthening/mobility  - Encourage toileting schedule  Outcome: Progressing     Problem: SKIN/TISSUE INTEGRITY - ADULT  Goal: Skin integrity remains intact  Description: INTERVENTIONS  - Assess and document risk factors for pressure ulcer development  - Assess and document skin integrity  - Monitor for areas of redness and/or skin breakdown  - Initiate interventions, skin care algorithm/standards of care as needed  Outcome: Progressing  Goal: Incision(s), wounds(s) or drain site(s) healing without S/S of infection  Description: INTERVENTIONS:  - Assess and document risk factors for pressure ulcer development  - Assess and document skin integrity  - Assess and document dressing/incision, wound bed, drain sites and surrounding tissue  - Implement wound care per orders  - Initiate isolation precautions as appropriate  - Initiate Pressure Ulcer prevention bundle as indicated  Outcome: Progressing  Goal: Oral mucous membranes remain intact  Description: INTERVENTIONS  - Assess oral mucosa and hygiene practices  - Implement preventative oral hygiene regimen  - Implement oral medicated treatments as ordered  Outcome: Progressing     Problem: MUSCULOSKELETAL - ADULT  Goal: Return mobility to safest level of function  Description: INTERVENTIONS:  - Assess patient stability and activity tolerance for  standing, transferring and ambulating w/ or w/o assistive devices  - Assist with transfers and ambulation using safe patient handling equipment as needed  - Ensure adequate protection for wounds/incisions during mobilization  - Obtain PT/OT consults as needed  - Advance activity as appropriate  - Communicate ordered activity level and limitations with patient/family  Outcome: Progressing  Goal: Maintain proper alignment of affected body part  Description: INTERVENTIONS:  - Support and protect limb and body alignment per provider's orders  - Instruct and reinforce with patient and family use of appropriate assistive device and precautions (e.g. spinal or hip dislocation precautions)  Outcome: Progressing     Problem: Impaired Functional Mobility  Goal: Achieve highest/safest level of mobility/gait  Description: Interventions:  - Assess patient's functional ability and stability  - Promote increasing activity/tolerance for mobility and gait  - Educate and engage patient/family in tolerated activity level and precaution  Outcome: Progressing

## (undated) DEVICE — SYRINGE MED 20ML STD CLR PLAS LL TIP N CTRL

## (undated) DEVICE — DISPOSABLE TOURNIQUET CUFF SINGLE BLADDER, DUAL PORT AND QUICK CONNECT CONNECTOR: Brand: COLOR CUFF

## (undated) DEVICE — PACK,UNIVERSAL,SPLIT,II: Brand: MEDLINE

## (undated) DEVICE — DRESSING SUR 9X25CM SIL SP CVR WTRPRF VIR

## (undated) DEVICE — 3M™ COBAN™ NL STERILE NON-LATEX SELF-ADHERENT WRAP, 2084S, 4 IN X 5 YD (10 CM X 4,5 M), 18 ROLLS/CASE: Brand: 3M™ COBAN™

## (undated) DEVICE — GAMMEX® PI HYBRID SIZE 8, STERILE POWDER-FREE SURGICAL GLOVE, POLYISOPRENE AND NEOPRENE BLEND: Brand: GAMMEX

## (undated) DEVICE — NEPTUNE E-SEP SMOKE EVACUATION PENCIL, COATED, 70MM BLADE, PUSH BUTTON SWITCH: Brand: NEPTUNE E-SEP

## (undated) DEVICE — HANDPIECE SET WITH HIGH FLOW TIP AND SUCTION TUBE: Brand: INTERPULSE

## (undated) DEVICE — DRAPE,U/ SHT,SPLIT,PLAS,STERIL: Brand: MEDLINE

## (undated) DEVICE — NEEDLE SPNL 18GA L3.5IN W/ QNCKE SHARPER BVL

## (undated) DEVICE — ADHESIVE SKIN TOP FOR WND CLSR DERMBND ADV

## (undated) DEVICE — STRYKER PERFORMANCE SERIES SAGITTAL BLADE: Brand: STRYKER PERFORMANCE SERIES

## (undated) DEVICE — SOLUTION PREP 26ML 0.7% POVACRYLEX 74% ISO

## (undated) DEVICE — SUT COAT VCRL 0 27IN CT-1 ABSRB VLT 36MM 1/2

## (undated) DEVICE — APPLICATOR PREP 26ML CHG 2% ISO ALC 70%

## (undated) DEVICE — 2T11 #2 PDO 36 X 36: Brand: 2T11 #2 PDO 36 X 36

## (undated) DEVICE — SUT MCRYL 2-0 36IN CT-1 ABSRB UD L36MM TAPR P

## (undated) DEVICE — BLADE SAW 1.14X2.17IN THK0.025IN CUT

## (undated) DEVICE — GAMMEX® PI HYBRID SIZE 8.5, STERILE POWDER-FREE SURGICAL GLOVE, POLYISOPRENE AND NEOPRENE BLEND: Brand: GAMMEX

## (undated) DEVICE — SOLUTION IRRIG 1000ML 0.9% NACL USP BTL

## (undated) DEVICE — SCREW BNE 48MM CNDYL KNEE HEX HD NXGN LEG

## (undated) DEVICE — 3 BONE CEMENT MIXER: Brand: MIXEVAC

## (undated) DEVICE — HOOD: Brand: FLYTE

## (undated) DEVICE — Device: Brand: STABLECUT®

## (undated) DEVICE — DRESSING ALG 9X30CM HYDRCOLL POLYUR FLM SIL

## (undated) DEVICE — T5 HOOD WITH PEEL AWAY FACE SHIELD

## (undated) DEVICE — SHEET,DRAPE,53X77,STERILE: Brand: MEDLINE

## (undated) DEVICE — SUT COAT VCRL+ 1 18IN CTX ABSRB VLT ANTIBACT

## (undated) DEVICE — SCREW BNE L33MM CCK HEX HD STEM FOR LEG NXGN

## (undated) DEVICE — SUT MCRYL 3-0 27IN ABSRB UD L24MM PS-1

## (undated) DEVICE — SKIN PREP TRAY 4 COMPARTM TRAY: Brand: MEDLINE INDUSTRIES, INC.

## (undated) DEVICE — SOLUTION IRRIG 3000ML 0.9% NACL FLX CONT

## (undated) DEVICE — PACK CDS TOTAL KNEE

## (undated) DEVICE — GAMMEX® NON-LATEX PI ORTHO SIZE 8.5, STERILE POLYISOPRENE POWDER-FREE SURGICAL GLOVE: Brand: GAMMEX